# Patient Record
Sex: FEMALE | Race: BLACK OR AFRICAN AMERICAN | NOT HISPANIC OR LATINO | Employment: STUDENT | URBAN - METROPOLITAN AREA
[De-identification: names, ages, dates, MRNs, and addresses within clinical notes are randomized per-mention and may not be internally consistent; named-entity substitution may affect disease eponyms.]

---
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
## 2023-10-31 NOTE — PROGRESS NOTES
Damon is a 23 year old No obstetric history on file. female who presents for annual exam.     Besides routine health maintenance, she has no other health concerns today .  HPI: Damon is a generally healthy 23 yr old G0 here for an annual exam. Besides routine health maintenance she was started on Wellbutrin for anxiety. Will need to have a follow up visit in 4-6 weeks but this can be virtual.    Menses are regular q month and normal lasting 3-5 days.   Menses flow: normal.    Patient's last menstrual period was 10/23/2023..   Using not sexually active for contraception.    She is not currently considering pregnancy.    REPRODUCTIVE/GYNECOLOGIC HISTORY:  Damon is not sexually active with female partner(s) and is not currently in monogamous relationship.   STI testing offered?   Accepts  History of abnormal Pap smear:  No  SOCIAL HISTORY  Abuse: does not report having previously been physical or sexually abused.    Do you feel safe in your environment? YES       She  reports that she has been smoking. She has never used smokeless tobacco.    Last PHQ-9 score on record =       11/1/2023     3:04 PM   PHQ-9 SCORE   PHQ-9 Total Score 8     Last GAD7 score on record =       11/1/2023     3:04 PM   DNOALD-7 SCORE   Total Score 11         HEALTH MAINTENANCE:    Care Gaps  Close care gaps     Overdue          Never done NICOTINE/TOBACCO CESSATION COUNSELING Q 1 YR (Yearly)       Never done ADVANCE CARE PLANNING (Every 5 Years)       Never done HIV SCREENING (Once)       JUN 28 2019 YEARLY PREVENTIVE VISIT (Yearly)  Last completed: Jun 28, 2018     Never done HEPATITIS C SCREENING (Once)       JAN 1 2023 PHQ-2 (once per calendar year) (Yearly, January to December)  Last completed: Aug 28, 2019     Never done PAP (Every 3 Years)   Order placed this encounter     AUG 19   2023 DTAP/TDAP/TD IMMUNIZATION (7 - Td or Tdap)  Last completed: Aug 19, 2013     SEP 1   2023 INFLUENZA VACCINE (1)  Last completed: Oct 12, 2022     SEP 1     COVID-19 Vaccine (3 - 2023-24 season)  Last completed: Oct 12, 2022           HEALTHY HABITS  Eating habits: eats at irregular times, only eating once per day due to no appetite, has inadequate calcium intake  Calcium/Vitamin D intake: source:  dairy Adequate? Unlikely and recommended supplementation  Exercise: How often do you exercise? Irregular, has anxiety when she tires to do weight lifting which she enjoys;Walking and Strength Training  Have you had an eye exam in the last two years? YES     Do you routinely see the dentist once or twice yearly? YES        HISTORY:  OB History    Para Term  AB Living   0 0 0 0 0 0   SAB IAB Ectopic Multiple Live Births   0 0 0 0 0     No past medical history on file.  No past surgical history on file.  Family History   Problem Relation Age of Onset    Asthma Father     Diabetes Other         grandmother    Hypertension Other         grandmother    Eye Disorder Sister      Social History     Socioeconomic History    Marital status: Single   Tobacco Use    Smoking status: Every Day    Smokeless tobacco: Never    Tobacco comments:     mom smokes inside   Substance and Sexual Activity    Alcohol use: No    Drug use: No    Sexual activity: Never   Social History Narrative    FAMILY INFORMATION     Date: 2009    Parent #1      Name: Brandon   Gender: male   : 1979      Education: In School   Occupation: None        Parent #2      Name: Not Given           Siblings:  Name: Allyson   : 2003        Relationship Status of Parent(s):     Who does the child live with? father    What language(s) is/are spoken at home? English       Current Outpatient Medications:     Cholecalciferol (VITAMIN D) 2000 units tablet, Take 2,000 Units by mouth daily, Disp: 100 tablet, Rfl: 11    montelukast (SINGULAIR) 10 MG tablet, Take 1 tablet (10 mg) by mouth At Bedtime, Disp: 90 tablet, Rfl: 1    albuterol (PROAIR HFA/PROVENTIL HFA/VENTOLIN HFA) 108  "(90 Base) MCG/ACT inhaler, INHALE 2 PUFFS INTO THE LUNGS EVERY 4 HOURS AS NEEDED FOR SHORTNESS OF BREATH OR DIFFICULT BREATHING OR WHEEZING OR COUGHING, Disp: 18 g, Rfl: 0    cetirizine (ZYRTEC) 10 MG tablet, Take 1 tablet (10 mg) by mouth every evening (Patient not taking: Reported on 11/1/2023), Disp: 90 tablet, Rfl: 3    loratadine (CLARITIN) 10 MG tablet, Take 1 tablet (10 mg) by mouth daily (Patient not taking: Reported on 6/28/2018), Disp: 30 tablet, Rfl: 3     Allergies   Allergen Reactions    Nkda [No Known Drug Allergy]        Past medical, surgical, social and family history were reviewed and updated in EPIC.    ROS:   12 point review of systems negative other than symptoms noted below or in the HPI. Of note her last TSH (5 years ago) was elevated and the recommended repeat was not done so will draw today and refer to endocrine if still elevated.     PHYSICAL EXAM:  /52   Ht 1.499 m (4' 11\")   Wt 82.6 kg (182 lb)   LMP 10/23/2023   BMI 36.76 kg/m     BMI: Body mass index is 36.76 kg/m .  Constitutional: healthy, alert and no distress  Neck: symmetrical, thyroid normal size, no masses present, no lymphadenopathy present.   Cardiovascular: RRR, no murmurs present  Respiratory: breathing unlabored, lungs CTA bilaterally  Breast:normal without masses, tenderness or nipple discharge and no palpable axillary masses or adenopathy  Gastrointestinal: abdomen soft, non-tender, bowel sounds present  PELVIC EXAM:  Vulva: No lesions, no adenopathy, BUS WNL  Vagina: Moist, pink, discharge normal  well rugated, no lesions. GC/Chlam collected  Cervix:smooth, pink, no visible lesions. Pap smear collected  Uterus: Normal size, non-tender, mobile  Ovaries: No masses palpated  Rectal exam: deferred    ASSESSMENT/PLAN:    ICD-10-CM    1. Well woman exam  Z01.419 Hemoglobin A1c     CBC with platelets     TSH with free T4 reflex      2. Cervical cancer screening  Z12.4 Pap screen only - recommended age 21 - 24 years    "   3. Screening for chlamydial disease  Z11.8 Chlamydia trachomatis/Neisseria gonorrhoeae by PCR - Clinic Collect      4. Screen for STD (sexually transmitted disease)  Z11.3 HIV Antigen Antibody Combo     Hepatitis C antibody        No results found for any visits on 23.      COUNSELING:   Reviewed preventive health counseling, as reflected in patient instructions       Regular exercise       Healthy diet/nutrition       Vision/dental screening       Immunizations  Vaccinated for: Influenza           Alcohol Use       Osteoporosis prevention/bone health       Consider Hep C screening for all patients one time for ages 18-79 years       HIV screeninx in teen years, 1x in adult years, and at intervals if high risk       Started on Wellbutrin XL and given 60 day supply. Needs to have a follow up visit to evaluate effectiveness. Is agreeable to a referral to [facility] [place] for mental health support.        Vicki Stone CNM

## 2023-11-01 ENCOUNTER — OFFICE VISIT (OUTPATIENT)
Dept: MIDWIFE SERVICES | Facility: CLINIC | Age: 23
End: 2023-11-01
Payer: COMMERCIAL

## 2023-11-01 VITALS
BODY MASS INDEX: 36.69 KG/M2 | HEIGHT: 59 IN | WEIGHT: 182 LBS | DIASTOLIC BLOOD PRESSURE: 52 MMHG | SYSTOLIC BLOOD PRESSURE: 100 MMHG

## 2023-11-01 DIAGNOSIS — Z11.8 SCREENING FOR CHLAMYDIAL DISEASE: ICD-10-CM

## 2023-11-01 DIAGNOSIS — Z01.419 WELL WOMAN EXAM: Primary | ICD-10-CM

## 2023-11-01 DIAGNOSIS — Z12.4 CERVICAL CANCER SCREENING: ICD-10-CM

## 2023-11-01 DIAGNOSIS — Z11.3 SCREEN FOR STD (SEXUALLY TRANSMITTED DISEASE): ICD-10-CM

## 2023-11-01 DIAGNOSIS — F41.9 ANXIETY: ICD-10-CM

## 2023-11-01 LAB
ERYTHROCYTE [DISTWIDTH] IN BLOOD BY AUTOMATED COUNT: 12.9 % (ref 10–15)
HBA1C MFR BLD: 5.3 % (ref 0–5.6)
HCT VFR BLD AUTO: 37.9 % (ref 35–47)
HGB BLD-MCNC: 12.4 G/DL (ref 11.7–15.7)
MCH RBC QN AUTO: 30.2 PG (ref 26.5–33)
MCHC RBC AUTO-ENTMCNC: 32.7 G/DL (ref 31.5–36.5)
MCV RBC AUTO: 92 FL (ref 78–100)
PLATELET # BLD AUTO: 276 10E3/UL (ref 150–450)
RBC # BLD AUTO: 4.1 10E6/UL (ref 3.8–5.2)
T4 FREE SERPL-MCNC: 1.08 NG/DL (ref 0.9–1.7)
TSH SERPL DL<=0.005 MIU/L-ACNC: 7.52 UIU/ML (ref 0.3–4.2)
WBC # BLD AUTO: 6.6 10E3/UL (ref 4–11)

## 2023-11-01 PROCEDURE — 90471 IMMUNIZATION ADMIN: CPT | Performed by: ADVANCED PRACTICE MIDWIFE

## 2023-11-01 PROCEDURE — 87591 N.GONORRHOEAE DNA AMP PROB: CPT | Performed by: ADVANCED PRACTICE MIDWIFE

## 2023-11-01 PROCEDURE — 84439 ASSAY OF FREE THYROXINE: CPT | Performed by: ADVANCED PRACTICE MIDWIFE

## 2023-11-01 PROCEDURE — 85027 COMPLETE CBC AUTOMATED: CPT | Performed by: ADVANCED PRACTICE MIDWIFE

## 2023-11-01 PROCEDURE — 90686 IIV4 VACC NO PRSV 0.5 ML IM: CPT | Performed by: ADVANCED PRACTICE MIDWIFE

## 2023-11-01 PROCEDURE — 86803 HEPATITIS C AB TEST: CPT | Performed by: ADVANCED PRACTICE MIDWIFE

## 2023-11-01 PROCEDURE — 99213 OFFICE O/P EST LOW 20 MIN: CPT | Mod: 25 | Performed by: ADVANCED PRACTICE MIDWIFE

## 2023-11-01 PROCEDURE — 84443 ASSAY THYROID STIM HORMONE: CPT | Performed by: ADVANCED PRACTICE MIDWIFE

## 2023-11-01 PROCEDURE — 99385 PREV VISIT NEW AGE 18-39: CPT | Mod: 25 | Performed by: ADVANCED PRACTICE MIDWIFE

## 2023-11-01 PROCEDURE — 87389 HIV-1 AG W/HIV-1&-2 AB AG IA: CPT | Performed by: ADVANCED PRACTICE MIDWIFE

## 2023-11-01 PROCEDURE — 83036 HEMOGLOBIN GLYCOSYLATED A1C: CPT | Performed by: ADVANCED PRACTICE MIDWIFE

## 2023-11-01 PROCEDURE — 36415 COLL VENOUS BLD VENIPUNCTURE: CPT | Performed by: ADVANCED PRACTICE MIDWIFE

## 2023-11-01 PROCEDURE — 87491 CHLMYD TRACH DNA AMP PROBE: CPT | Performed by: ADVANCED PRACTICE MIDWIFE

## 2023-11-01 PROCEDURE — G0145 SCR C/V CYTO,THINLAYER,RESCR: HCPCS | Performed by: ADVANCED PRACTICE MIDWIFE

## 2023-11-01 RX ORDER — BUPROPION HYDROCHLORIDE 150 MG/1
150 TABLET ORAL EVERY MORNING
Qty: 30 TABLET | Refills: 1 | Status: SHIPPED | OUTPATIENT
Start: 2023-11-01 | End: 2024-01-24

## 2023-11-01 ASSESSMENT — ANXIETY QUESTIONNAIRES
7. FEELING AFRAID AS IF SOMETHING AWFUL MIGHT HAPPEN: MORE THAN HALF THE DAYS
3. WORRYING TOO MUCH ABOUT DIFFERENT THINGS: MORE THAN HALF THE DAYS
5. BEING SO RESTLESS THAT IT IS HARD TO SIT STILL: NOT AT ALL
2. NOT BEING ABLE TO STOP OR CONTROL WORRYING: MORE THAN HALF THE DAYS
GAD7 TOTAL SCORE: 11
1. FEELING NERVOUS, ANXIOUS, OR ON EDGE: NEARLY EVERY DAY
6. BECOMING EASILY ANNOYED OR IRRITABLE: SEVERAL DAYS
IF YOU CHECKED OFF ANY PROBLEMS ON THIS QUESTIONNAIRE, HOW DIFFICULT HAVE THESE PROBLEMS MADE IT FOR YOU TO DO YOUR WORK, TAKE CARE OF THINGS AT HOME, OR GET ALONG WITH OTHER PEOPLE: SOMEWHAT DIFFICULT
GAD7 TOTAL SCORE: 11

## 2023-11-01 ASSESSMENT — PATIENT HEALTH QUESTIONNAIRE - PHQ9
5. POOR APPETITE OR OVEREATING: SEVERAL DAYS
SUM OF ALL RESPONSES TO PHQ QUESTIONS 1-9: 8

## 2023-11-02 DIAGNOSIS — R79.89 ELEVATED TSH: Primary | ICD-10-CM

## 2023-11-02 LAB
C TRACH DNA SPEC QL PROBE+SIG AMP: NEGATIVE
HCV AB SERPL QL IA: NONREACTIVE
HIV 1+2 AB+HIV1 P24 AG SERPL QL IA: NONREACTIVE
N GONORRHOEA DNA SPEC QL NAA+PROBE: NEGATIVE

## 2023-11-04 LAB
BKR LAB AP GYN ADEQUACY: NORMAL
BKR LAB AP GYN INTERPRETATION: NORMAL
BKR LAB AP HPV REFLEX: NO
BKR LAB AP PREVIOUS ABNORMAL: NORMAL
PATH REPORT.COMMENTS IMP SPEC: NORMAL
PATH REPORT.COMMENTS IMP SPEC: NORMAL
PATH REPORT.RELEVANT HX SPEC: NORMAL

## 2023-11-08 ENCOUNTER — VIRTUAL VISIT (OUTPATIENT)
Dept: BEHAVIORAL HEALTH | Facility: CLINIC | Age: 23
End: 2023-11-08
Payer: COMMERCIAL

## 2023-11-08 DIAGNOSIS — F41.1 GAD (GENERALIZED ANXIETY DISORDER): Primary | ICD-10-CM

## 2023-11-08 DIAGNOSIS — F33.1 MAJOR DEPRESSIVE DISORDER, RECURRENT EPISODE, MODERATE (H): ICD-10-CM

## 2023-11-08 PROCEDURE — 90791 PSYCH DIAGNOSTIC EVALUATION: CPT | Performed by: COUNSELOR

## 2023-11-08 ASSESSMENT — COLUMBIA-SUICIDE SEVERITY RATING SCALE - C-SSRS
REASONS FOR IDEATION PAST MONTH: COMPLETELY TO END OR STOP THE PAIN (YOU COULDN'T GO ON LIVING WITH THE PAIN OR HOW YOU WERE FEELING)
TOTAL  NUMBER OF INTERRUPTED ATTEMPTS LIFETIME: NO
1. IN THE PAST MONTH, HAVE YOU WISHED YOU WERE DEAD OR WISHED YOU COULD GO TO SLEEP AND NOT WAKE UP?: YES
1. HAVE YOU WISHED YOU WERE DEAD OR WISHED YOU COULD GO TO SLEEP AND NOT WAKE UP?: YES
6. HAVE YOU EVER DONE ANYTHING, STARTED TO DO ANYTHING, OR PREPARED TO DO ANYTHING TO END YOUR LIFE?: NO
ATTEMPT LIFETIME: NO
REASONS FOR IDEATION LIFETIME: COMPLETELY TO END OR STOP THE PAIN (YOU COULDN'T GO ON LIVING WITH THE PAIN OR HOW YOU WERE FEELING)
2. HAVE YOU ACTUALLY HAD ANY THOUGHTS OF KILLING YOURSELF?: NO
TOTAL  NUMBER OF ABORTED OR SELF INTERRUPTED ATTEMPTS LIFETIME: NO

## 2023-11-08 ASSESSMENT — PATIENT HEALTH QUESTIONNAIRE - PHQ9
SUM OF ALL RESPONSES TO PHQ QUESTIONS 1-9: 15
10. IF YOU CHECKED OFF ANY PROBLEMS, HOW DIFFICULT HAVE THESE PROBLEMS MADE IT FOR YOU TO DO YOUR WORK, TAKE CARE OF THINGS AT HOME, OR GET ALONG WITH OTHER PEOPLE: SOMEWHAT DIFFICULT
SUM OF ALL RESPONSES TO PHQ QUESTIONS 1-9: 15

## 2023-11-08 ASSESSMENT — ANXIETY QUESTIONNAIRES
3. WORRYING TOO MUCH ABOUT DIFFERENT THINGS: NEARLY EVERY DAY
7. FEELING AFRAID AS IF SOMETHING AWFUL MIGHT HAPPEN: MORE THAN HALF THE DAYS
4. TROUBLE RELAXING: MORE THAN HALF THE DAYS
5. BEING SO RESTLESS THAT IT IS HARD TO SIT STILL: SEVERAL DAYS
IF YOU CHECKED OFF ANY PROBLEMS ON THIS QUESTIONNAIRE, HOW DIFFICULT HAVE THESE PROBLEMS MADE IT FOR YOU TO DO YOUR WORK, TAKE CARE OF THINGS AT HOME, OR GET ALONG WITH OTHER PEOPLE: VERY DIFFICULT
1. FEELING NERVOUS, ANXIOUS, OR ON EDGE: NEARLY EVERY DAY
2. NOT BEING ABLE TO STOP OR CONTROL WORRYING: MORE THAN HALF THE DAYS
GAD7 TOTAL SCORE: 15
GAD7 TOTAL SCORE: 15
6. BECOMING EASILY ANNOYED OR IRRITABLE: MORE THAN HALF THE DAYS

## 2023-11-08 NOTE — PROGRESS NOTES
"[facility] - Integrated Behavioral Health    Provider Name:  Jie Nolan     Credentials:  MSW, SIL    PATIENT'S NAME: Damon Mcintosh  PREFERRED NAME:   PRONOUNS: She/her  MRN: [id number]  : 2000  ADDRESS: [address] [place] [address] Syed Hernandez  [facility] [address]  ACCT. NUMBER:  [id number]  DATE OF SERVICE: 23  START TIME: 1:08pm  END TIME: 2pm  PREFERRED PHONE: [phone]  May we leave a program related message: Yes  SERVICE MODALITY:  In-person    UNIVERSAL ADULT Mental Health DIAGNOSTIC ASSESSMENT    Identifying Information:  Patient is a 23 year old, . The pronoun use throughout this assessment reflects the patient's chosen pronoun. Patient was referred for an assessment by primary care clinic. Patient attended the session alone.    Chief Complaint:   The reason for seeking services at this time is: \"Anxiety and Depression\". The problem(s) began 10/13/19.    Patient has attempted to resolve these concerns in the past through therapy .    Social/Family History:  Patient reported they grew up in [facility]. They were raised by biological parents. Parents  /  at 10 years old. Pt shared she has 3 siblings; 1 brother (14) and 2 sisters (20 and 6). Pt shared she is close with her mother. Father passed away when she was 20 yo. She shared that she is close with some of her grandparents. Her mother in is [place], brother and sister in [place] and youngest sistered in [place].     The patient describes their cultural background as . Cultural influences and impact on patient's life structure, values, norms, and healthcare: N/A. Contextual influences on patient's health include: none identified. These factors will be addressed in the Preliminary Treatment plan. Patient identified their preferred language to be English. Patient reported they does not need the assistance of an  or other support involved in therapy.     Patient reported had no " significant delays in developmental tasks. Patient's highest education level was some college, currently at [employer] studying Philosophy and Ethnic Studies. Patient identified the following learning problems: none reported. Modifications will not be used to assist communication in therapy. Patient reports they are  able to understand written materials.    Patient's current relationship status is single. She has had 1 relationship for 2 months. Patient identified their sexual orientation as unsure at this time, possibly asexual or not. Patient reported having 0 child(amrit). Patient identified parents; siblings; friends as part of their support system. Patient identified the quality of these relationships as good.      Patient's current living/housing situation involves dorm. They report that housing is stable.    Patient is currently unemployed. Patient reports their finances are obtained through grandparents. Patient does identify finances as a current stressor.      Patient reported that they have not been involved with the legal system. Patient does not report being under probation/ parole/ jurisdiction.    Patient's Strengths and Limitations:  Patient identified the following strengths or resources that will help them succeed in treatment: commitment to health and well being, friends / good social support, family support, insight, intelligence, positive school connection, motivation, and strong social skills. Things that may interfere with the patient's success in treatment include: none identified.     Assessments:  The following assessments were completed by patient for this visit:  PHQ9:       11/1/2023     3:04 PM 11/8/2023    12:59 PM   PHQ-9 SCORE   PHQ-9 Total Score MyChart  15 (Moderately severe depression)   PHQ-9 Total Score 8 15     GAD2:       11/8/2023     1:06 PM   DONALD-2   Feeling nervous, anxious, or on edge 3   Not being able to stop or control worrying 2   DONALD-2 Total Score 5     GAD7:        11/1/2023     3:04 PM 11/8/2023     1:06 PM   DONALD-7 SCORE   Total Score  15 (severe anxiety)   Total Score 11 15     CAGE-AID:       11/8/2023     1:07 PM   CAGE-AID Total Score   Total Score 0   Total Score MyChart 0 (A total score of 2 or greater is considered clinically significant)     PROMIS 10-Global Health (only subscores and total score):       11/8/2023     1:07 PM   PROMIS-10 Scores Only   Global Mental Health Score 8   Global Physical Health Score 12   PROMIS TOTAL - SUBSCORES 20     [place] Suicide Severity Rating Scale (Lifetime/Recent)      11/8/2023     1:00 PM   [place] Suicide Severity Rating (Lifetime/Recent)   Q1 Wish to be Dead (Lifetime) Y   Wish to be Dead Description (Lifetime) 14 years old, felt alone and simply didn't want to exist anymore   1. Wish to be Dead (Past 1 Month) Y   Wish to be Dead Description (Past 1 Month) Thoughts that it would be easier to not be here   Q2 Non-Specific Active Suicidal Thoughts (Lifetime) N   Most Severe Ideation Rating (Lifetime) 2   Description of Most Severe Ideation (Lifetime) Thoughts that it would be easier to not be here   Most Severe Ideation Rating (Past 1 Month) 3   Description of Most Severe Ideation (Past 1 Month) Thoughts that it would be easier to not be here   Frequency (Lifetime) 3   Frequency (Past 1 Month) 3   Duration (Lifetime) 1   Duration (Past 1 Month) 2   Controllability (Lifetime) 3   Controllability (Past 1 Month) 3   Deterrents (Lifetime) 1   Deterrents (Past 1 Month) 1   Reasons for Ideation (Lifetime) 5   Reasons for Ideation (Past 1 Month) 5   Actual Attempt (Lifetime) N   Has subject engaged in non-suicidal self-injurious behavior? (Lifetime) N   Interrupted Attempts (Lifetime) N   Aborted or Self-Interrupted Attempt (Lifetime) N   Preparatory Acts or Behavior (Lifetime) N   Calculated C-SSRS Risk Score (Lifetime/Recent) Low Risk       Personal and Family Medical History:  Patient does not report a family history of mental  health concerns.  Patient reports family history includes Asthma in her father; Diabetes in an other family member; Eye Disorder in her sister; Hypertension in an other family member. Possibly with her mother and anxiety. Mother, father, aunts/uncles struggle with heroine, fetynal, cocaine    Patient does report Mental Health Diagnosis and/or Treatment. Patient reported the following previous diagnoses which include(s): none. Patient reported symptoms began at the age of 14.  Patient has received mental health services in the past: therapy. Psychiatric Hospitalizations: none  Patient denies a history of civil commitment.      Currently, patient is not receiving other mental health services.    Patient has had a physical exam to rule out medical causes for current symptoms.  Date of last physical exam was within the past year. Client was encouraged to follow up with PCP if symptoms were to develop. The patient has a [facility] Provider, who is named Britney Cheung. Patient reports no current medical and/or dental concerns. Patient denies any issues with pain. There are significant appetite / nutritional concerns / weight changes. Patient does not report a history of head injury / trauma / cognitive impairment.    Patient reports current meds as:   Current Outpatient Medications   Medication    albuterol (PROAIR HFA/PROVENTIL HFA/VENTOLIN HFA) 108 (90 Base) MCG/ACT inhaler    buPROPion (WELLBUTRIN XL) 150 MG 24 hr tablet    cetirizine (ZYRTEC) 10 MG tablet    Cholecalciferol (VITAMIN D) 2000 units tablet    loratadine (CLARITIN) 10 MG tablet    montelukast (SINGULAIR) 10 MG tablet     No current facility-administered medications for this visit.       Medication Adherence:  Patient reports taking.    Patient Allergies:    Allergies   Allergen Reactions    Nkda [No Known Drug Allergy]        Medical History:  No past medical history on file.      Current Mental Status Exam:   Appearance:  Appropriate     Eye Contact:  Good   Psychomotor:  Normal       Gait / station:  N/A  Attitude / Demeanor: Cooperative   Speech      Rate / Production: Normal/ Responsive      Volume:  Normal  volume      Language:  intact  Mood:   Anxious  Depressed   Affect:   Appropriate    Thought Content: Clear   Thought Process: Coherent  Logical       Associations: No loosening of associations  Insight:   Good   Judgment:  Intact   Orientation:  All  Attention/concentration: Good    Substance Use:  Patient did report a family history of substance use concerns; see medical history section for details.  Patient has not received chemical dependency treatment in the past.  Patient has not ever been to detox.      Patient is not currently receiving any chemical dependency treatment.           Substance History of use Age of first use Date of last use     Pattern and duration of use (include amounts and frequency)   Alcohol never used       REPORTS SUBSTANCE USE: N/A   Cannabis   used in the past 19 11/04/23 REPORTS SUBSTANCE USE: reports using substance 1 times per week and has 1   at a time.   Patient reports heaviest use was over the summer.     Amphetamines   never used     REPORTS SUBSTANCE USE: N/A   Cocaine/crack    never used       REPORTS SUBSTANCE USE: N/A   Hallucinogens never used         REPORTS SUBSTANCE USE: N/A   Inhalants never used         REPORTS SUBSTANCE USE: N/A   Heroin never used         REPORTS SUBSTANCE USE: N/A   Other Opiates never used     REPORTS SUBSTANCE USE: N/A   Benzodiazepine   never used     REPORTS SUBSTANCE USE: N/A   Barbiturates never used     REPORTS SUBSTANCE USE: N/A   Over the counter meds never used     REPORTS SUBSTANCE USE: N/A   Caffeine used in the past Unknown   REPORTS SUBSTANCE USE: N/A   Nicotine  currently use 22 11/08/23 REPORTS SUBSTANCE USE: reports using substance 1 times per day and has continuous   at a time.   Patient reports heaviest use is current use.   Other substances not listed  above:  Identify:  never used     REPORTS SUBSTANCE USE: N/A     Patient reported the following problems as a result of their substance use: no problems, not applicable.    Substance Use: No symptoms    Based on the negative CAGE score and clinical interview there  are not indications of drug or alcohol abuse.    Significant Losses / Trauma / Abuse / Neglect Issues:   Patient did not serve in the .  There are indications or report of significant loss, trauma, abuse or neglect issues related to: are indications or report of significant loss, trauma, abuse or neglect issues related to witnessing the near death of both her parents and death of her father.  Concerns for possible neglect are not present.    Safety Assessment:   Patient denies current homicidal ideation and behaviors.  Patient denies current self-injurious ideation and behaviors.    Patient denied risk behaviors associated with substance use.  Patient denies any high risk behaviors associated with mental health symptoms.  Patient reports the following current concerns for their personal safety: None.  Patient reports there are not firearms in the house.     History of Safety Concerns:  Patient denied a history of homicidal ideation.     Patient denied a history of personal safety concerns.    Patient denied a history of assaultive behaviors.    Patient denied a history of sexual assault behaviors.     Patient denied a history of risk behaviors associated with substance use.  Patient denies any history of high risk behaviors associated with mental health symptoms.  Patient reports the following protective factors: forward or future oriented thinking; dedication to family or friends; living with other people; other    Risk Plan: See Recommendations for Safety and Risk Management Plan    Review of Symptoms per patient report:  Depression: Change in sleep, Lack of interest, Excessive or inappropriate guilt, Change in energy level, Difficulties  concentrating, Change in appetite, Psychomotor slowing or agitation, Ruminations, Irritability, Feeling sad, down, or depressed, Withdrawn, Poor hygeine, and Frequent crying  Radha:  No Symptoms  Psychosis: No Symptoms  Anxiety: Excessive worry, Physical complaints, such as headaches, stomachaches, muscle tension, Social anxiety, Sleep disturbance, Ruminations, Poor concentration, and Irritability  Panic:  Palpitations, Tremors, Shortness of breath, Tingling, and Sense of impending doom  Post Traumatic Stress Disorder:  Experienced traumatic event watched both her parents almost die, Reexperiencing of trauma, Avoids traumatic stimuli, Hypervigilance, Impaired functioning, and new fears of being able to protect others and not being able to  She avoids thing that remind her of this time  Eating Disorder: No Symptoms  ADD / ADHD:  No symptoms  Conduct Disorder: No symptoms  Autism Spectrum Disorder: No symptoms  Obsessive Compulsive Disorder: No Symptoms    Patient reports the following compulsive behaviors and treatment history: Picking - has not had treatment..      Diagnostic Criteria:   Generalized Anxiety Disorder  A. Excessive anxiety and worry about a number of events or activities (such as work or school performance).   B. The person finds it difficult to control the worry.  C. Select 3 or more symptoms (required for diagnosis). Only one item is required in children.   - Restlessness or feeling keyed up or on edge.    - Difficulty concentrating or mind going blank.    - Irritability.    - Muscle tension.    - Sleep disturbance (difficulty falling or staying asleep, or restless unsatisfying sleep).   D. The focus of the anxiety and worry is not confined to features of an Axis I disorder.  E. The anxiety, worry, or physical symptoms cause clinically significant distress or impairment in social, occupational, or other important areas of functioning.   F. The disturbance is not due to the direct physiological  effects of a substance (e.g., a drug of abuse, a medication) or a general medical condition (e.g., hyperthyroidism) and does not occur exclusively during a Mood Disorder, a Psychotic Disorder, or a Pervasive Developmental Disorder. Major Depressive Disorder  A) Recurrent episode(s) - symptoms have been present during the same 2-week period and represent a change from previous functioning 5 or more symptoms (required for diagnosis)   - Depressed mood. Note: In children and adolescents, can be irritable mood.     - Diminished interest or pleasure in all, or almost all, activities.    - Decreased sleep.    - Psychomotor activity retardation.    - Fatigue or loss of energy.    - Feelings of worthlessness or inappropriate guilt.    - Diminished ability to think or concentrate, or indecisiveness.    - Recurrent thoughts of death (not just fear of dying), recurrent suicidal ideation without a specific plan, or a suicide attempt or a specific plan for committing suicide.   B) The symptoms cause clinically significant distress or impairment in social, occupational, or other important areas of functioning  C) The episode is not attributable to the physiological effects of a substance or to another medical condition  D) The occurence of major depressive episode is not better explained by other thought / psychotic disorders  E) There has never been a manic episode or hypomanic episode    Functional Status:  Patient reports the following functional impairments:  academic performance, health maintenance, management of the household and or completion of tasks, organization, relationship(s), self-care, social interactions, and work / vocational responsibilities.     Nonprogrammatic care:  Patient is requesting basic services to address current mental health concerns.    Clinical Summary:  1. Psychosocial, Cultural and Contextual Factors: Pt is currently a college student in her senior year.   .  2. Principal DSM5 Diagnoses   (Sustained by DSM5 Criteria Listed Above):   300.02 (F41.1) Generalized Anxiety Disorder.  3. Other Diagnoses that is relevant to services:   296.32 (F33.1) Major Depressive Disorder, Recurrent Episode, Moderate _ and With anxious distress.  4. Provisional Diagnosis:  none.  5. Prognosis: Expect Improvement and Relieve Acute Symptoms.  6. Likely consequences of symptoms if not treated: Continued increased in depressive and anxiety symptoms.  7. Client strengths include:  educated, empathetic, goal-focused, good listener, has a previous history of therapy, insightful, intelligent, motivated, open to learning, open to suggestions / feedback, support of family, friends and providers, supportive, wants to learn, willing to ask questions, willing to relate to others, and work history .     Recommendations:     1. Plan for Safety and Risk Management:   Safety and Risk: Recommended that patient call 911 or go to the local ED should there be a change in any of these risk factors..          Report to child / adult protection services was NA.     2. Patient's identified no specific concerns for identity needing to be discussed in treatment at this time.      3. Initial Treatment will focus on:    Depressed Mood - Build coping skills  Anxiety - Build coping skills .     4. Resources/Service Plan:    services are not indicated.   Modifications to assist communication are not indicated.   Additional disability accommodations are not indicated.      5. Collaboration:   Collaboration / coordination of treatment will be initiated with the following  support professionals:  GYN .      6.  Referrals:   The following referral(s) will be initiated:  none at this time .       A Release of Information has been obtained for the following:  none .     Clinical Substantiation/medical necessity for the above recommendations:   Pt will benefit from therapeutic intervention to improve low mood and heightened anxiety.    7. FERDINAND:     FERDINAND:  Discussed the general effects of drugs and alcohol on health and well-being. Provider gave patient printed information about the  effects of chemical use on their health and well being. Recommendations:  none.     8. Records:   These were reviewed at time of assessment.   Information in this assessment was obtained from the medical record and  provided by patient who is a good historian. Patient will have open access to their mental health medical record.    9.   Interactive Complexity: No    10. Safety Plan:  When the patient identifies the following:  Wish to die    The following is recommended:   Per clinical judgment, provider is making the following recommendations continued assessment for increased safety concerns, at this time risk is mitigated through meaningful relationships, enrollment in college, living in community through her dorm with a roommate, and supportive family and friendships.    Provider Name/ Credentials:  IMMANUEL Carr, LICSW  November 8, 2023

## 2023-11-17 ENCOUNTER — TELEPHONE (OUTPATIENT)
Dept: ENDOCRINOLOGY | Facility: CLINIC | Age: 23
End: 2023-11-17
Payer: COMMERCIAL

## 2023-11-17 NOTE — TELEPHONE ENCOUNTER
Patient call:     Appointment type: New Endocrine   Provider: Any   Return date: see below   Speciality phone number: [phone]  Additional appointment(s) needed: N/A   Additional notes: LVM, MyC x1   schedule in Endocrine within 2 months  per protocol. LUIS FERNANDO okay per protocol     Examples:    [place]17 [place] [facility]  [place] [facility]  1[place]19 in [place] [facility]  1[facility]19 [facility]    Please note that the above appointment(s) will require manual scheduling as they are marked as LUIS FERNANDO and will not appear using auto search. Do not schedule the patient if another patient has already been scheduled in the requested appointment slot.     Lena Patel on 11/17/2023 at 11:16 AM

## 2023-11-17 NOTE — TELEPHONE ENCOUNTER
Patient call:      Appointment type: New Endocrine   Provider: Any   Return date: see below   Speciality phone number: [phone]  Additional appointment(s) needed: N/A   Additional notes: LVM, MyC x1   schedule within 1-2 weeks per protocol. LUIS FERNANDO on call & LUIS FERNANDO okay per protocol.    Examples:    12/8 [facility]  12/27 [facility]  12/29 [facility]    Please note that the above appointment(s) will require manual scheduling as they are marked as LUIS FERNANDO and will not appear using auto search. Do not schedule the patient if another patient has already been scheduled in the requested appointment slot.       Lena Patel on 11/17/2023 at 3:11 PM

## 2023-11-17 NOTE — TELEPHONE ENCOUNTER
Patient called.  [place] and [place] to attend ANY type of appointment the full month of January 2024.  Please re-evaluate.

## 2023-11-20 ENCOUNTER — TELEPHONE (OUTPATIENT)
Dept: ENDOCRINOLOGY | Facility: CLINIC | Age: 23
End: 2023-11-20
Payer: COMMERCIAL

## 2023-11-21 NOTE — CONFIDENTIAL NOTE
RECORDS RECEIVED FROM: internal    DATE RECEIVED: 12.8.23    NOTES (FOR ALL VISITS) STATUS DETAILS   OFFICE NOTES from referring provider internal  Vicki Stone,      MEDICATION LIST internal       LABS     DIABETES: HBGA1C, CREATININE, FASTING LIPIDS, MICROALBUMIN URINE, POTASSIUM, TSH, T4    THYROID: TSH, T4, CBC, THYRODLONULIN, TOTAL T3, FREE T4, CALCITONIN, CEA internal  T4- 11.1.23   TSH- 11.1.23   Cbc- 11.1.23   HBGA1C- 11.1.23

## 2023-11-22 ENCOUNTER — VIRTUAL VISIT (OUTPATIENT)
Dept: BEHAVIORAL HEALTH | Facility: CLINIC | Age: 23
End: 2023-11-22
Payer: COMMERCIAL

## 2023-11-22 DIAGNOSIS — F33.1 MAJOR DEPRESSIVE DISORDER, RECURRENT EPISODE, MODERATE (H): ICD-10-CM

## 2023-11-22 DIAGNOSIS — F41.1 GAD (GENERALIZED ANXIETY DISORDER): Primary | ICD-10-CM

## 2023-11-22 PROCEDURE — 90834 PSYTX W PT 45 MINUTES: CPT | Mod: VID | Performed by: COUNSELOR
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
## 2023-12-07 ENCOUNTER — VIRTUAL VISIT (OUTPATIENT)
Dept: BEHAVIORAL HEALTH | Facility: CLINIC | Age: 23
End: 2023-12-07
Payer: COMMERCIAL

## 2023-12-07 DIAGNOSIS — F33.1 MAJOR DEPRESSIVE DISORDER, RECURRENT EPISODE, MODERATE (H): ICD-10-CM

## 2023-12-07 DIAGNOSIS — F41.1 GAD (GENERALIZED ANXIETY DISORDER): Primary | ICD-10-CM

## 2023-12-07 PROCEDURE — 90834 PSYTX W PT 45 MINUTES: CPT | Mod: VID | Performed by: COUNSELOR
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
## 2023-12-08 ENCOUNTER — TELEPHONE (OUTPATIENT)
Dept: ENDOCRINOLOGY | Facility: CLINIC | Age: 23
End: 2023-12-08

## 2023-12-08 ENCOUNTER — VIRTUAL VISIT (OUTPATIENT)
Dept: ENDOCRINOLOGY | Facility: CLINIC | Age: 23
End: 2023-12-08
Payer: COMMERCIAL

## 2023-12-08 ENCOUNTER — PRE VISIT (OUTPATIENT)
Dept: ENDOCRINOLOGY | Facility: CLINIC | Age: 23
End: 2023-12-08

## 2023-12-08 DIAGNOSIS — E03.8 SUBCLINICAL HYPOTHYROIDISM: Primary | ICD-10-CM

## 2023-12-08 DIAGNOSIS — Z83.49 FAMILY HISTORY OF THYROID DISEASE: ICD-10-CM

## 2023-12-08 PROCEDURE — 99203 OFFICE O/P NEW LOW 30 MIN: CPT | Mod: VID | Performed by: INTERNAL MEDICINE

## 2023-12-08 RX ORDER — LEVOTHYROXINE SODIUM 50 UG/1
50 TABLET ORAL DAILY
Qty: 90 TABLET | Refills: 1 | Status: SHIPPED | OUTPATIENT
Start: 2023-12-08 | End: 2024-04-23

## 2023-12-08 NOTE — PROGRESS NOTES
"Video visit    Start time 8:41, stop time 8:55; additional 10 minutes spent on the date of the encounter doing chart review, documentation and further activities as noted.     [facility], [address] [place] [id number]    Patient location: patient home    Mode of transmission: video     Subjective:    New patient, no prior Endocrine notes including Care Everywhere     Damon Mcintosh is a 23 year old female who presents to review thyroid function.    The following are the only TFTs in the EHR including Care Everywhere.         Damon was first told she had thyroid dysfunction after the 11/1/2023 labs returned. She has never been on a medication for the thyroid. No prior thyroid US. No known thyroid nodule. No prior neck surgery. No prior H/N radiation. No prior use of lithium. No prior use of amiodarone. No compressive symptoms, she hasn't noticed any neck nodularity.     She denies fatigue. Weight has been steady. Occasional cold intolerance.     Her maternal grandmother had thyroid disease - details not known.    Her only current medication is bupropion for anxiety. No other comorbidities outside of asthma.     She recently stopped using tobacco.      No prior pregnancies. No plans for pregnancy in the near future.     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     Objective:    Video visit - no overt thyroid eye disease. 4'11\", ~180 #.     Assessment/Plan:    # Subclinical hypothyroidism    We reviewed the indications to treat subclinical hypothyroidism.  I rx levothyroxine 50 mcg once daily and I instructed Damon to take it first thing in the morning on an empty stomach and wait 30 minutes after before eating or drinking anything besides water.  We reviewed the treatment of subclinical hypothyroidism/hypothyroidism during pregnancy. Damon has no plans for pregnancy in the near future.    I did order repeat TSH as well as TPO antibodies to be done in 2 months.  We will also refer her to " a PCP and once her TSH is normal I will turn over her care to her PCP.    She does not currently have a phone but can be contacted via [employer].  Our team will reach out to contact her and set up labs and a PCP appointment.

## 2023-12-08 NOTE — TELEPHONE ENCOUNTER
Patient call:     Appointment type: LABS   Provider: PER DR. MCKEON  Return date:SCHEDULE AROUND 02/09/2024  Speciality phone number: [phone]  Additional appointment(s) needed:      Additional notes:  LVM AND SENT MYC X1   Lily Salgado on 12/8/2023 at 3:14 PM      Jean-Pierre Mckeon MD  [id number]  Can she be set up for labs in 2 months and also set up with a PCP to establish care?    She can be reached via My Chart, she doesn't currently have a phone.    Thanks, Sonu

## 2023-12-08 NOTE — LETTER
"12/8/2023       RE: Damon Mcintosh  [address] [facility] [address]     Dear Colleague,    Thank you for referring your patient, Damon Mcintosh, to the [facility] [place] [facility]. Please see a copy of my visit note below.    Video visit    Start time 8:41, stop time 8:55; additional 10 minutes spent on the date of the encounter doing chart review, documentation and further activities as noted.     [facility], [address] [place] [id number]    Patient location: patient home    Mode of transmission: video     Subjective:    New patient, no prior Endocrine notes including Care Everywhere     Damon Mcintosh is a 23 year old female who presents to review thyroid function.    The following are the only TFTs in the EHR including Care Everywhere.         Damon was first told she had thyroid dysfunction after the 11/1/2023 labs returned. She has never been on a medication for the thyroid. No prior thyroid US. No known thyroid nodule. No prior neck surgery. No prior H/N radiation. No prior use of lithium. No prior use of amiodarone. No compressive symptoms, she hasn't noticed any neck nodularity.     She denies fatigue. Weight has been steady. Occasional cold intolerance.     Her maternal grandmother had thyroid disease - details not known.    Her only current medication is bupropion for anxiety. No other comorbidities outside of asthma.     She recently stopped using tobacco.      No prior pregnancies. No plans for pregnancy in the near future.     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     Objective:    Video visit - no overt thyroid eye disease. 4'11\", ~180 #.     Assessment/Plan:    # Subclinical hypothyroidism    We reviewed the indications to treat subclinical hypothyroidism.  I rx levothyroxine 50 mcg once daily and I instructed Damon to take it first thing in the morning on an empty stomach and " wait 30 minutes after before eating or drinking anything besides water.  We reviewed the treatment of subclinical hypothyroidism/hypothyroidism during pregnancy. Damon has no plans for pregnancy in the near future.    I did order repeat TSH as well as TPO antibodies to be done in 2 months.  We will also refer her to a PCP and once her TSH is normal I will turn over her care to her PCP.    She does not currently have a phone but can be contacted via [facility] [employer].  Our team will reach out to contact her and set up labs and a PCP appointment.      Jean-Pierre Mckeon MD

## 2023-12-08 NOTE — NURSING NOTE
Is the patient currently in the state of [place]? YES    Visit mode:VIDEO    If the visit is dropped, the patient can be reconnected by: VIDEO VISIT: Send to e-mail at: [email or web address]    Will anyone else be joining the visit? NO  (If patient encounters technical issues they should call [phone])    How would you like to obtain your AVS? MyChart    Are changes needed to the allergy or medication list? Patient reported no changes were made to her e-check in since it was completed for visit so VF did not review medications and allergies again with patient.    Reason for visit: Consult (Elevated TSH )    Claudia THAKKAR

## 2023-12-13 ENCOUNTER — TELEPHONE (OUTPATIENT)
Dept: ENDOCRINOLOGY | Facility: CLINIC | Age: 23
End: 2023-12-13
Payer: COMMERCIAL

## 2023-12-13 NOTE — TELEPHONE ENCOUNTER
Patient call:      Appointment type: LABS   Provider: PER DR. MCKEON  Return date:SCHEDULE AROUND 02/09/2024  Speciality phone number: [phone]  Additional appointment(s) needed:       Additional notes: sent letter AND SENT MYC X2 (final)   Lauri Albertociscoink on 12/13/2023 at 11:26 AM          Jean-Pierre Mckeon MD  [id number]  Can she be set up for labs in 2 months and also set up with a PCP to establish care?    She can be reached via My Chart, she doesn't currently have a phone.    Thanks, Sonu

## 2023-12-19 ENCOUNTER — VIRTUAL VISIT (OUTPATIENT)
Dept: BEHAVIORAL HEALTH | Facility: CLINIC | Age: 23
End: 2023-12-19
Payer: COMMERCIAL

## 2023-12-19 DIAGNOSIS — F41.1 GAD (GENERALIZED ANXIETY DISORDER): Primary | ICD-10-CM

## 2023-12-19 DIAGNOSIS — F33.1 MAJOR DEPRESSIVE DISORDER, RECURRENT EPISODE, MODERATE (H): ICD-10-CM

## 2023-12-19 PROCEDURE — 90834 PSYTX W PT 45 MINUTES: CPT | Mod: VID | Performed by: COUNSELOR
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
## 2024-01-24 DIAGNOSIS — F41.9 ANXIETY: ICD-10-CM

## 2024-01-24 RX ORDER — BUPROPION HYDROCHLORIDE 150 MG/1
150 TABLET ORAL EVERY MORNING
Qty: 30 TABLET | Refills: 1 | Status: SHIPPED | OUTPATIENT
Start: 2024-01-24 | End: 2024-09-05

## 2024-01-24 NOTE — TELEPHONE ENCOUNTER
Bupropion      Last Written Prescription Date:  11/1/2023  Last Fill Quantity: 30,   # refills: 0  Last Office Visit: 11/1/2023  Future Office visit:   N/A  Nadiya Guevara CMA

## 2024-01-24 NOTE — TELEPHONE ENCOUNTER
Routing refill request to provider for review/approval because:  Drug not on the [facility] refill protocol     JOANN Duarte

## 2024-02-07 ENCOUNTER — VIRTUAL VISIT (OUTPATIENT)
Dept: BEHAVIORAL HEALTH | Facility: CLINIC | Age: 24
End: 2024-02-07
Payer: COMMERCIAL

## 2024-02-07 DIAGNOSIS — F33.1 MAJOR DEPRESSIVE DISORDER, RECURRENT EPISODE, MODERATE (H): ICD-10-CM

## 2024-02-07 DIAGNOSIS — F41.1 GAD (GENERALIZED ANXIETY DISORDER): Primary | ICD-10-CM

## 2024-02-07 PROCEDURE — 90832 PSYTX W PT 30 MINUTES: CPT | Mod: 95 | Performed by: COUNSELOR
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
## 2024-04-23 ENCOUNTER — VIRTUAL VISIT (OUTPATIENT)
Dept: INTERNAL MEDICINE | Facility: CLINIC | Age: 24
End: 2024-04-23
Payer: COMMERCIAL

## 2024-04-23 DIAGNOSIS — E03.8 SUBCLINICAL HYPOTHYROIDISM: ICD-10-CM

## 2024-04-23 PROCEDURE — 99213 OFFICE O/P EST LOW 20 MIN: CPT | Mod: 95 | Performed by: INTERNAL MEDICINE

## 2024-04-23 RX ORDER — LEVOTHYROXINE SODIUM 50 UG/1
50 TABLET ORAL DAILY
Qty: 30 TABLET | Refills: 1 | Status: SHIPPED | OUTPATIENT
Start: 2024-04-23 | End: 2024-09-05

## 2024-04-23 ASSESSMENT — ASTHMA QUESTIONNAIRES
QUESTION_5 LAST FOUR WEEKS HOW WOULD YOU RATE YOUR ASTHMA CONTROL: COMPLETELY CONTROLLED
QUESTION_1 LAST FOUR WEEKS HOW MUCH OF THE TIME DID YOUR ASTHMA KEEP YOU FROM GETTING AS MUCH DONE AT WORK, SCHOOL OR AT HOME: NONE OF THE TIME
QUESTION_3 LAST FOUR WEEKS HOW OFTEN DID YOUR ASTHMA SYMPTOMS (WHEEZING, COUGHING, SHORTNESS OF BREATH, CHEST TIGHTNESS OR PAIN) WAKE YOU UP AT NIGHT OR EARLIER THAN USUAL IN THE MORNING: NOT AT ALL
ACT_TOTALSCORE: 25
QUESTION_4 LAST FOUR WEEKS HOW OFTEN HAVE YOU USED YOUR RESCUE INHALER OR NEBULIZER MEDICATION (SUCH AS ALBUTEROL): NOT AT ALL
QUESTION_2 LAST FOUR WEEKS HOW OFTEN HAVE YOU HAD SHORTNESS OF BREATH: NOT AT ALL
ACT_TOTALSCORE: 25

## 2024-04-23 ASSESSMENT — PATIENT HEALTH QUESTIONNAIRE - PHQ9
10. IF YOU CHECKED OFF ANY PROBLEMS, HOW DIFFICULT HAVE THESE PROBLEMS MADE IT FOR YOU TO DO YOUR WORK, TAKE CARE OF THINGS AT HOME, OR GET ALONG WITH OTHER PEOPLE: VERY DIFFICULT
SUM OF ALL RESPONSES TO PHQ QUESTIONS 1-9: 14
SUM OF ALL RESPONSES TO PHQ QUESTIONS 1-9: 14

## 2024-04-23 NOTE — PROGRESS NOTES
"Damon is a 24 year old who is being evaluated via a billable video visit.    How would you like to obtain your AVS? MyChart  If the video visit is dropped, the invitation should be resent by: Text to cell phone: [phone]  Will anyone else be joining your video visit? No      Assessment & Plan     Subclinical hypothyroidism  Has stopped Synthroid 3 weeks ago.   Refilled prescription for current dose  Follow up lab - TSH/ FT4 in 1 month , for dose adjustment   - levothyroxine (SYNTHROID/LEVOTHROID) 50 MCG tablet; Take 1 tablet (50 mcg) by mouth daily  - TSH with free T4 reflex; Future            BMI  Estimated body mass index is 36.76 kg/m  as calculated from the following:    Height as of 11/1/23: 1.499 m (4' 11\").    Weight as of 11/1/23: 82.6 kg (182 lb).          See Patient Instructions    Subjective   Damon is a 24 year old, presenting for the following health issues:  Thyroid Problem (Pt was advised that she would need to establish care with a PCP asap)        4/23/2024     5:21 PM   Additional Questions   Roomed by Lubna ABREU   Accompanied by n/a     Video Start Time: 5:40 PM    History of Present Illness       Hypothyroidism:     Since last visit, patient describes the following symptoms::  Anxiety, Depression and Fatigue    She eats 0-1 servings of fruits and vegetables daily.She consumes 1 sweetened beverage(s) daily.She exercises with enough effort to increase her heart rate 10 to 19 minutes per day.  She exercises with enough effort to increase her heart rate 3 or less days per week.   She is taking medications regularly.       Hypothyroidism Follow-up    Since last visit, patient describes the following symptoms: weight loss of 5 lbs, dry skin, loose stools, anxiety, depression, and fatigue  How many servings of fruits and vegetables do you eat daily?  0-1  On average, how many sweetened beverages do you drink each day (Examples: soda, juice, sweet tea, etc.  Do NOT count diet or artificially sweetened " beverages)?   1  How many days per week do you exercise enough to make your heart beat faster? 3 or less  How many minutes a day do you exercise enough to make your heart beat faster? 10 - 19  How many days per week do you miss taking your medication? Patient is currently out of levothyroxine  What makes it hard for you to take your medications?   Not having it    Has history of hypothyroidism. On replacement treatment with Synthroid. No heat /cold intolerance, heart palpitations, weight loss/ gain ,  change in bowel habits.  Has run out of Synthroid 3 weeks ago. Noted feeling more tired and sleeping more.       Review of Systems  Constitutional, HEENT, cardiovascular, pulmonary, gi and gu systems are negative, except as otherwise noted.      Objective    Vitals - Patient Reported  Pain Score: No Pain (0)      Vitals:  No vitals were obtained today due to virtual visit.    Physical Exam   GENERAL: alert and no distress  EYES: Eyes grossly normal to inspection.  No discharge or erythema, or obvious scleral/conjunctival abnormalities.  RESP: No audible wheeze, cough, or visible cyanosis.    SKIN: Visible skin clear. No significant rash, abnormal pigmentation or lesions.  NEURO: Cranial nerves grossly intact.  Mentation and speech appropriate for age.  PSYCH: Appropriate affect, tone, and pace of words    Office Visit on 11/01/2023   Component Date Value Ref Range Status    Interpretation 11/01/2023 Negative for Intraepithelial Lesion or Malignancy (NILM)    Final    Comment 11/01/2023    Final                    Value:This result contains rich text formatting which cannot be displayed here.    Specimen Adequacy 11/01/2023 Satisfactory for evaluation, endocervical/transformation zone component absent   Final    Clinical Information 11/01/2023    Final                    Value:This result contains rich text formatting which cannot be displayed here.    Reflex Testing 11/01/2023 No   Final    Previous Abnormal?  11/01/2023    Final                    Value:This result contains rich text formatting which cannot be displayed here.    Performing Labs 11/01/2023    Final                    Value:This result contains rich text formatting which cannot be displayed here.    Chlamydia Trachomatis 11/01/2023 Negative  Negative Final    Negative for C. trachomatis rRNA by transcription mediated amplification.   A negative result by transcription mediated amplification does not preclude the presence of infection because results are dependent on proper and adequate collection, absence of inhibitors and sufficient rRNA to be detected.    Neisseria gonorrhoeae 11/01/2023 Negative  Negative Final    Negative for N. gonorrhoeae rRNA by transcription mediated amplification. A negative result by transcription mediated amplification does not preclude the presence of C. trachomatis infection because results are dependent on proper and adequate collection, absence of inhibitors and sufficient rRNA to be detected.    HIV Antigen Antibody Combo 11/01/2023 Nonreactive  Nonreactive Final    HIV-1 p24 Ag & HIV-1/HIV-2 Ab Not Detected    Hepatitis C Antibody 11/01/2023 Nonreactive  Nonreactive Final    Hemoglobin A1C 11/01/2023 5.3  0.0 - 5.6 % Final    Normal <5.7%   Prediabetes 5.7-6.4%    Diabetes 6.5% or higher     Note: Adopted from ADA consensus guidelines.    WBC Count 11/01/2023 6.6  4.0 - 11.0 10e3/uL Final    RBC Count 11/01/2023 4.10  3.80 - 5.20 10e6/uL Final    Hemoglobin 11/01/2023 12.4  11.7 - 15.7 g/dL Final    Hematocrit 11/01/2023 37.9  35.0 - 47.0 % Final    MCV 11/01/2023 92  78 - 100 fL Final    MCH 11/01/2023 30.2  26.5 - 33.0 pg Final    MCHC 11/01/2023 32.7  31.5 - 36.5 g/dL Final    RDW 11/01/2023 12.9  10.0 - 15.0 % Final    Platelet Count 11/01/2023 276  150 - 450 10e3/uL Final    TSH 11/01/2023 7.52 (H)  0.30 - 4.20 uIU/mL Final    Free T4 11/01/2023 1.08  0.90 - 1.70 ng/dL Final         Video-Visit Details    Type of  service:  Video Visit   Video End Time:5:49 PM  Originating Location (pt. Location): Home    Distant Location (provider location):  On-site  Platform used for Video Visit: Marilee  Signed Electronically by: Deonte Merino MD

## 2024-09-05 ENCOUNTER — OFFICE VISIT (OUTPATIENT)
Dept: FAMILY MEDICINE | Facility: CLINIC | Age: 24
End: 2024-09-05
Attending: INTERNAL MEDICINE
Payer: COMMERCIAL

## 2024-09-05 VITALS
SYSTOLIC BLOOD PRESSURE: 90 MMHG | HEIGHT: 60 IN | WEIGHT: 186.8 LBS | RESPIRATION RATE: 16 BRPM | OXYGEN SATURATION: 99 % | DIASTOLIC BLOOD PRESSURE: 52 MMHG | HEART RATE: 58 BPM | TEMPERATURE: 97.4 F | BODY MASS INDEX: 36.67 KG/M2

## 2024-09-05 DIAGNOSIS — F41.9 ANXIETY: ICD-10-CM

## 2024-09-05 DIAGNOSIS — Z86.59 HISTORY OF POSTTRAUMATIC STRESS DISORDER (PTSD): ICD-10-CM

## 2024-09-05 DIAGNOSIS — J45.20 MILD INTERMITTENT ASTHMA, UNSPECIFIED WHETHER COMPLICATED: ICD-10-CM

## 2024-09-05 DIAGNOSIS — Z23 IMMUNIZATION DUE: ICD-10-CM

## 2024-09-05 DIAGNOSIS — E03.9 ACQUIRED HYPOTHYROIDISM: Primary | ICD-10-CM

## 2024-09-05 LAB
T4 FREE SERPL-MCNC: 1.04 NG/DL (ref 0.9–1.7)
TSH SERPL DL<=0.005 MIU/L-ACNC: 19.59 UIU/ML (ref 0.3–4.2)

## 2024-09-05 PROCEDURE — 99214 OFFICE O/P EST MOD 30 MIN: CPT | Mod: 25

## 2024-09-05 PROCEDURE — 36415 COLL VENOUS BLD VENIPUNCTURE: CPT

## 2024-09-05 PROCEDURE — 96127 BRIEF EMOTIONAL/BEHAV ASSMT: CPT

## 2024-09-05 PROCEDURE — 84443 ASSAY THYROID STIM HORMONE: CPT

## 2024-09-05 PROCEDURE — 84439 ASSAY OF FREE THYROXINE: CPT

## 2024-09-05 PROCEDURE — 90715 TDAP VACCINE 7 YRS/> IM: CPT

## 2024-09-05 PROCEDURE — 90471 IMMUNIZATION ADMIN: CPT

## 2024-09-05 RX ORDER — BUPROPION HYDROCHLORIDE 150 MG/1
150 TABLET ORAL EVERY MORNING
Qty: 30 TABLET | Refills: 1 | Status: SHIPPED | OUTPATIENT
Start: 2024-09-05

## 2024-09-05 RX ORDER — LEVOTHYROXINE SODIUM 50 UG/1
50 TABLET ORAL DAILY
Qty: 90 TABLET | Refills: 1 | Status: SHIPPED | OUTPATIENT
Start: 2024-09-05

## 2024-09-05 RX ORDER — ALBUTEROL SULFATE 90 UG/1
2 AEROSOL, METERED RESPIRATORY (INHALATION) EVERY 4 HOURS PRN
Qty: 18 G | Refills: 1 | Status: SHIPPED | OUTPATIENT
Start: 2024-09-05

## 2024-09-05 ASSESSMENT — ANXIETY QUESTIONNAIRES
GAD7 TOTAL SCORE: 9
GAD7 TOTAL SCORE: 9
8. IF YOU CHECKED OFF ANY PROBLEMS, HOW DIFFICULT HAVE THESE MADE IT FOR YOU TO DO YOUR WORK, TAKE CARE OF THINGS AT HOME, OR GET ALONG WITH OTHER PEOPLE?: VERY DIFFICULT
GAD7 TOTAL SCORE: 9
7. FEELING AFRAID AS IF SOMETHING AWFUL MIGHT HAPPEN: SEVERAL DAYS

## 2024-09-05 ASSESSMENT — ASTHMA QUESTIONNAIRES
ACT_TOTALSCORE: 25
QUESTION_1 LAST FOUR WEEKS HOW MUCH OF THE TIME DID YOUR ASTHMA KEEP YOU FROM GETTING AS MUCH DONE AT WORK, SCHOOL OR AT HOME: NONE OF THE TIME
QUESTION_3 LAST FOUR WEEKS HOW OFTEN DID YOUR ASTHMA SYMPTOMS (WHEEZING, COUGHING, SHORTNESS OF BREATH, CHEST TIGHTNESS OR PAIN) WAKE YOU UP AT NIGHT OR EARLIER THAN USUAL IN THE MORNING: NOT AT ALL
QUESTION_4 LAST FOUR WEEKS HOW OFTEN HAVE YOU USED YOUR RESCUE INHALER OR NEBULIZER MEDICATION (SUCH AS ALBUTEROL): NOT AT ALL
QUESTION_5 LAST FOUR WEEKS HOW WOULD YOU RATE YOUR ASTHMA CONTROL: COMPLETELY CONTROLLED
QUESTION_2 LAST FOUR WEEKS HOW OFTEN HAVE YOU HAD SHORTNESS OF BREATH: NOT AT ALL
ACT_TOTALSCORE: 25

## 2024-09-05 ASSESSMENT — PATIENT HEALTH QUESTIONNAIRE - PHQ9
10. IF YOU CHECKED OFF ANY PROBLEMS, HOW DIFFICULT HAVE THESE PROBLEMS MADE IT FOR YOU TO DO YOUR WORK, TAKE CARE OF THINGS AT HOME, OR GET ALONG WITH OTHER PEOPLE: VERY DIFFICULT
SUM OF ALL RESPONSES TO PHQ QUESTIONS 1-9: 17
SUM OF ALL RESPONSES TO PHQ QUESTIONS 1-9: 17

## 2024-09-05 ASSESSMENT — PAIN SCALES - GENERAL: PAINLEVEL: NO PAIN (0)

## 2024-09-05 NOTE — PROGRESS NOTES
"  Assessment & Plan     Acquired hypothyroidism  Patient has history of hypothyroidism. Patient was started on 50 mcg of Levothyroxine, did not follow up and has been taking intermittently for the last year. Patient had not had medication since . Reevaluating Thyroid function with lab workup.     - TSH with free T4 reflex; Future  - TSH with free T4 reflex    Mild intermittent asthma, unspecified whether complicated  Patient has a history of intermittent asthma and inhalers were .    - albuterol (PROAIR HFA/PROVENTIL HFA/VENTOLIN HFA) 108 (90 Base) MCG/ACT inhaler; Inhale 2 puffs into the lungs every 4 hours as needed for shortness of breath, wheezing or cough.    Anxiety  Patient has a history of anxiety. DONALD score 15 and PHQ score 14 during today's visit. Patient reports she has not taken medication for months. Restarting today due to screening scores and history.     - buPROPion (WELLBUTRIN XL) 150 MG 24 hr tablet; Take 1 tablet (150 mg) by mouth every morning.    Immunization due    - TDAP 7+ (ADACEL,BOOSTRIX)    History of posttraumatic stress disorder (PTSD)  Patient reports a history of trauma and is fearful to go outside due to PTSD.     - Adult Mental Health  Referral; Future    Patient has been advised of split billing requirements and indicates understanding: Yes        BMI  Estimated body mass index is 36.92 kg/m  as calculated from the following:    Height as of this encounter: 1.515 m (4' 11.65\").    Weight as of this encounter: 84.7 kg (186 lb 12.8 oz).   Weight management plan: Discussed healthy diet and exercise guidelines    Depression Screening Follow Up        2024    10:09 AM   PHQ   PHQ-9 Total Score 17   Q9: Thoughts of better off dead/self-harm past 2 weeks Not at all         2024    10:09 AM   Last PHQ-9   1.  Little interest or pleasure in doing things 2   2.  Feeling down, depressed, or hopeless 2   3.  Trouble falling or staying asleep, or sleeping too much 3 "   4.  Feeling tired or having little energy 3   5.  Poor appetite or overeating 3   6.  Feeling bad about yourself 2   7.  Trouble concentrating 2   8.  Moving slowly or restless 0   Q9: Thoughts of better off dead/self-harm past 2 weeks 0   PHQ-9 Total Score 17         Follow Up Actions Taken  Crisis resource information provided in After Visit Summary  Mental Health Referral placed     Counseling  Appropriate preventive services were addressed with this patient via screening, questionnaire, or discussion as appropriate for fall prevention, nutrition, physical activity, Tobacco-use cessation, social engagement, weight loss and cognition.  Checklist reviewing preventive services available has been given to the patient.  Reviewed patient's diet, addressing concerns and/or questions.   Patient is at risk for social isolation and has been provided with information about the benefit of social connection.   The patient was instructed to see the dentist every 6 months.   The patient's PHQ-9 score is consistent with moderate depression. She was provided with information regarding depression.       CONSULTATION/REFERRAL to Mental Health    Subjective   Damon is a 24 year old, presenting for the following health issues:  Thyroid Problem (Subclinical hypothyroidism - patient scheduled lab appointment to do thyroid labs, was told that she needs a PCP to continue to watch thyroid levels ), Establish Care, and Refill Request (Patient requesting all medications to be refilled )      9/5/2024    12:45 PM   Additional Questions   Roomed by Eryn VIRGEN   Patient is in overall general good health. Patient reports history of Hypothyroidism and she did not follow up due to studying abroad. Patient has not taken medication consistently and reports last administration was June 2024 or 3 months ago. Transportation can be difficult to appointments. Patient has not been taking Wellbutrin due to running out of supply. Patient reports  trauma from before Covid and is fearful to go outside. Patient had no other concerns at this time. Patient education and encouraged to return for annual wellness visit.     Abnormal Mood Symptoms  Onset/Duration: Anxiety x 1 year   Description: Situational depression and Generalized Anxiety   Depression (if yes, do PHQ-9): YES  Anxiety (if yes, do DONALD-7): YES  Accompanying Signs & Symptoms:  Still participating in activities that you used to enjoy: No  Fatigue: YES- sometimes   Irritability: YES- sometimes   Difficulty concentrating: YES  Changes in appetite: YES  Problems with sleep: YES  Heart racing/beating fast: No  Abnormally elevated, expansive, or irritable mood: YES  Persistently increased activity or energy: YES- sometimes   Thoughts of hurting yourself or others: No  History:  Recent stress or major life event: YES  Prior depression or anxiety: Yes   Family history of depression or anxiety: Not sure   Alcohol/drug use: No  Difficulty sleeping: YES  Precipitating or alleviating factors: None  Therapies tried and outcome: medication(s) Wellbutrin (bupropion)        11/8/2023    12:59 PM 4/23/2024    10:21 AM 9/5/2024    10:09 AM   PHQ   PHQ-9 Total Score 15 14 17   Q9: Thoughts of better off dead/self-harm past 2 weeks Not at all Not at all Not at all         11/1/2023     3:04 PM 11/8/2023     1:06 PM 9/5/2024    10:10 AM   DONALD-7 SCORE   Total Score  15 (severe anxiety) 9 (mild anxiety)   Total Score 11 15 9     Asthma   Would like refill of inhaler       Review of Systems  CONSTITUTIONAL: NEGATIVE for fever, chills, change in weight  ENT/MOUTH: NEGATIVE for ear, mouth and throat problems  RESP: NEGATIVE for significant cough or SOB  CV: NEGATIVE for chest pain, palpitations or peripheral edema      Objective    LMP 08/30/2024 (Approximate)   There is no height or weight on file to calculate BMI.  Physical Exam   GENERAL: alert and no distress  EYES: Eyes grossly normal to inspection, PERRL and conjunctivae  and sclerae normal  HENT: ear canals and TM's normal, nose and mouth without ulcers or lesions  NECK: no adenopathy, no asymmetry, masses, or scars  RESP: lungs clear to auscultation - no rales, rhonchi or wheezes  CV: regular rate and rhythm, normal S1 S2, no S3 or S4, no murmur, click or rub, no peripheral edema  ABDOMEN: soft, nontender, no hepatosplenomegaly, no masses and bowel sounds normal  MS: no gross musculoskeletal defects noted, no edema  SKIN: no suspicious lesions or rashes  NEURO: Normal strength and tone, mentation intact and speech normal  PSYCH: mentation appears normal, affect normal/bright          Signed Electronically by: CARMEN Lozano CNP    Answers submitted by the patient for this visit:  Patient Health Questionnaire (Submitted on 9/5/2024)  If you checked off any problems, how difficult have these problems made it for you to do your work, take care of things at home, or get along with other people?: Very difficult  PHQ9 TOTAL SCORE: 17  Patient Health Questionnaire (G7) (Submitted on 9/5/2024)  DONALD 7 TOTAL SCORE: 9

## 2024-10-02 ENCOUNTER — PATIENT OUTREACH (OUTPATIENT)
Dept: CARE COORDINATION | Facility: CLINIC | Age: 24
End: 2024-10-02
Payer: COMMERCIAL

## 2024-10-02 DIAGNOSIS — J45.20 MILD INTERMITTENT ASTHMA, UNSPECIFIED WHETHER COMPLICATED: ICD-10-CM

## 2024-10-02 RX ORDER — ALBUTEROL SULFATE 90 UG/1
AEROSOL, METERED RESPIRATORY (INHALATION)
Qty: 18 G | Refills: 1 | OUTPATIENT
Start: 2024-10-02

## 2024-10-16 ENCOUNTER — PATIENT OUTREACH (OUTPATIENT)
Dept: CARE COORDINATION | Facility: CLINIC | Age: 24
End: 2024-10-16
Payer: COMMERCIAL

## 2024-12-02 ENCOUNTER — TELEPHONE (OUTPATIENT)
Dept: FAMILY MEDICINE | Facility: CLINIC | Age: 24
End: 2024-12-02
Payer: COMMERCIAL

## 2024-12-02 NOTE — TELEPHONE ENCOUNTER
Patient Quality Outreach    Patient is due for the following:   Depression  -  PHQ-9 needed    Action(s) Taken:   Patient was assigned appropriate questionnaire to complete    Type of outreach:    Sent [employer] message.    Questions for provider review:    None           Yazmin Galindo MA

## 2024-12-03 NOTE — TELEPHONE ENCOUNTER
Patient completed PHQ 9 and DONALD 7 via my chart.  Please review scores 5 or greater.    Yazmin Galindo [facility]        4/23/2024    10:21 AM 9/5/2024    10:09 AM 12/3/2024     3:32 PM   PHQ   PHQ-9 Total Score 14 17 9    Q9: Thoughts of better off dead/self-harm past 2 weeks Not at all  Not at all  Not at all        Patient-reported          11/8/2023     1:06 PM 9/5/2024    10:10 AM 12/3/2024     3:32 PM   DONALD-7 SCORE   Total Score 15 (severe anxiety) 9 (mild anxiety) 3 (minimal anxiety)   Total Score 15 9 3        Patient-reported

## 2024-12-04 NOTE — TELEPHONE ENCOUNTER
Spoke with patient regarding PHQ-9 scores and feedback regarding medications. Patient reports she feels she is improving. Patient has lab appointment on 12/9/24 to have thyroid rechecked following starting medication. Advised patient to make mental health appointment as orders were still in system and patient has not been able to connect. Patient reports her anxiety has recently elevated as she is looking for a job. Patient denies any thought of harming herself or others and identifies with thoughts for living.     Thank you    With Kine Regards,    CARMEN Lozano CNP

## 2024-12-09 ENCOUNTER — LAB (OUTPATIENT)
Dept: LAB | Facility: CLINIC | Age: 24
End: 2024-12-09
Payer: COMMERCIAL

## 2024-12-09 DIAGNOSIS — E03.9 ACQUIRED HYPOTHYROIDISM: ICD-10-CM

## 2024-12-09 LAB — TSH SERPL DL<=0.005 MIU/L-ACNC: 3 UIU/ML (ref 0.3–4.2)

## 2024-12-09 PROCEDURE — 36415 COLL VENOUS BLD VENIPUNCTURE: CPT

## 2024-12-09 PROCEDURE — 84443 ASSAY THYROID STIM HORMONE: CPT

## 2024-12-16 ENCOUNTER — ALLIED HEALTH/NURSE VISIT (OUTPATIENT)
Dept: FAMILY MEDICINE | Facility: CLINIC | Age: 24
End: 2024-12-16
Payer: COMMERCIAL

## 2024-12-16 DIAGNOSIS — Z11.1 TUBERCULIN SKIN TEST ENCOUNTER: Primary | ICD-10-CM

## 2024-12-16 PROCEDURE — 86580 TB INTRADERMAL TEST: CPT

## 2024-12-16 PROCEDURE — 99207 PR NO CHARGE NURSE ONLY: CPT

## 2024-12-16 NOTE — PROGRESS NOTES
"Patient is here today for a Mantoux (TST) test placement.    Is there a current order in the chart? No. Placed order according to standing order (reference the \"Skin Test- Tuberculosis Screening- Ambulatory Care\" standing order in [employer]). Review the Inclusion and Exclusion Criteria.    Inclusion Criteria  Pre-employment screening for healthcare workers and correctional facility staff - Administer two-step TST. Patient to return for second test in 1-3 weeks after first test is read.     Exclusion Criteria  None - Place order for Mantoux (TST) test per standing order.    Reason for Mantoux (TST) in patient's own words: pre employment, [employer]     Patient needs form signed? Yes- completed per clinic's forms process.    Instructed patient to wait for 15 minutes post injection and to report any reactions immediately to staff.    Told patient to return to clinic in 48-72 hours to have Mantoux (TST) read.         Carlee Ravi [facility]    "

## 2024-12-18 ENCOUNTER — ALLIED HEALTH/NURSE VISIT (OUTPATIENT)
Dept: FAMILY MEDICINE | Facility: CLINIC | Age: 24
End: 2024-12-18
Payer: COMMERCIAL

## 2024-12-18 DIAGNOSIS — Z11.1 SCREENING EXAMINATION FOR PULMONARY TUBERCULOSIS: Primary | ICD-10-CM

## 2024-12-18 LAB
PPDINDURATION: 0 MM (ref 0–4.99)
PPDREDNESS: NORMAL

## 2024-12-18 PROCEDURE — 99207 PR NO CHARGE NURSE ONLY: CPT

## 2024-12-18 NOTE — PROGRESS NOTES
Patient is here today for a Mantoux (TST) test results.    Did patient return to clinic 48-72 hours from Mantoux (TST) placement: Yes -     PPD Induration   Date Value Ref Range Status   12/18/2024 0 0 - 4.99 mm Final     PPD Redness   Date Value Ref Range Status   12/18/2024 Not Present  Final       Induration Size? Induration <5mm - Enter results in Enter/Edit Activity. Route results to ordering provider.     Patient needs form signed? Yes. Follow clinic form process.     Patient reports having previously had the BCG Vaccine: No    Does patient need a two step? No           Renzo Connor RN

## 2024-12-22 NOTE — NURSING NOTE
"Chief Complaint   Patient presents with    Physical       Initial /52   Ht 1.499 m (4' 11\")   Wt 82.6 kg (182 lb)   LMP 10/23/2023   BMI 36.76 kg/m   Estimated body mass index is 36.76 kg/m  as calculated from the following:    Height as of this encounter: 1.499 m (4' 11\").    Weight as of this encounter: 82.6 kg (182 lb).  BP completed using cuff size: regular    Questioned patient about current smoking habits.  Pt. currently smokes.  Advised about smoking cessation.      No obstetric history on file.    The following HM Due: pap smear  Cristopher (13-24)  Accepts flu shot    Candis Acevedo CMA on 11/1/2023 at 2:59 PM    " membranes are moist.      Pharynx: Oropharynx is clear. Uvula midline. Posterior oropharyngeal erythema and postnasal drip present. No pharyngeal swelling, oropharyngeal exudate or uvula swelling.   Eyes:      General: Lids are normal. Vision grossly intact. Gaze aligned appropriately.      Extraocular Movements: Extraocular movements intact.      Conjunctiva/sclera: Conjunctivae normal.      Pupils: Pupils are equal, round, and reactive to light.   Neck:      Trachea: Trachea and phonation normal.   Cardiovascular:      Rate and Rhythm: Normal rate and regular rhythm.      Heart sounds: Normal heart sounds.   Pulmonary:      Effort: Pulmonary effort is normal. No tachypnea or respiratory distress.      Breath sounds: Decreased air movement (diffusely) and transmitted upper airway sounds (coarse and turbulent air flow) present. No stridor. Wheezing (mild, end expiratory) present. No rhonchi or rales.   Chest:      Chest wall: No tenderness.   Musculoskeletal:      Cervical back: Full passive range of motion without pain, normal range of motion and neck supple. No rigidity or tenderness. No pain with movement or muscular tenderness. Normal range of motion.   Lymphadenopathy:      Cervical: No cervical adenopathy.   Skin:     General: Skin is warm and dry.   Neurological:      Mental Status: She is alert and oriented to person, place, and time.   Psychiatric:         Attention and Perception: Attention normal.         Speech: Speech normal.         Behavior: Behavior is cooperative.       PROCEDURES:  Unless otherwise noted below, none     Procedures    RESULTS:  No results found for this visit on 12/22/24.  An electronic signature was used to authenticate this note.    --Zeb Winters PA-C

## 2025-01-04 ENCOUNTER — HEALTH MAINTENANCE LETTER (OUTPATIENT)
Age: 25
End: 2025-01-04

## 2025-01-29 ENCOUNTER — ALLIED HEALTH/NURSE VISIT (OUTPATIENT)
Dept: FAMILY MEDICINE | Facility: CLINIC | Age: 25
End: 2025-01-29
Payer: COMMERCIAL

## 2025-01-29 DIAGNOSIS — Z11.1 SCREENING EXAMINATION FOR PULMONARY TUBERCULOSIS: Primary | ICD-10-CM

## 2025-01-29 PROCEDURE — 86580 TB INTRADERMAL TEST: CPT

## 2025-01-29 PROCEDURE — 99207 PR NO CHARGE NURSE ONLY: CPT

## 2025-01-29 NOTE — NURSING NOTE
"Patient is here today for a Mantoux (TST) test placement.    Is there a current order in the chart? No. Placed order according to standing order (reference the \"Skin Test- Tuberculosis Screening- Ambulatory Care\" standing order in [employer]). Review the Inclusion and Exclusion Criteria.      Inclusion Criteria  Work in health care     Exclusion Criteria  None - Place order for Mantoux (TST) test per standing order.    Reason for Mantoux (TST) in patient's own words: needs for health care work     Patient needs form signed? No - form not needed per patient.    Instructed patient to wait for 15 minutes post injection and to report any reactions immediately to staff.    Told patient to return to clinic in 48-72 hours to have Mantoux (TST) read.             "

## 2025-04-13 DIAGNOSIS — E03.9 ACQUIRED HYPOTHYROIDISM: ICD-10-CM

## 2025-04-14 RX ORDER — LEVOTHYROXINE SODIUM 50 UG/1
50 TABLET ORAL DAILY
Qty: 90 TABLET | Refills: 0 | Status: SHIPPED | OUTPATIENT
Start: 2025-04-14

## 2025-04-30 ENCOUNTER — PATIENT OUTREACH (OUTPATIENT)
Dept: CARE COORDINATION | Facility: CLINIC | Age: 25
End: 2025-04-30
Payer: COMMERCIAL

## 2025-08-31 ENCOUNTER — MYC REFILL (OUTPATIENT)
Dept: FAMILY MEDICINE | Facility: CLINIC | Age: 25
End: 2025-08-31
Payer: COMMERCIAL

## 2025-08-31 DIAGNOSIS — E03.9 ACQUIRED HYPOTHYROIDISM: ICD-10-CM

## 2025-08-31 DIAGNOSIS — J45.20 MILD INTERMITTENT ASTHMA, UNSPECIFIED WHETHER COMPLICATED: ICD-10-CM

## 2025-09-02 RX ORDER — LEVOTHYROXINE SODIUM 50 UG/1
50 TABLET ORAL DAILY
Qty: 90 TABLET | Refills: 0 | Status: SHIPPED | OUTPATIENT
Start: 2025-09-02

## 2025-09-02 RX ORDER — ALBUTEROL SULFATE 90 UG/1
2 INHALANT RESPIRATORY (INHALATION) EVERY 4 HOURS PRN
Qty: 18 G | Refills: 0 | Status: SHIPPED | OUTPATIENT
Start: 2025-09-02